# Patient Record
Sex: FEMALE | URBAN - METROPOLITAN AREA
[De-identification: names, ages, dates, MRNs, and addresses within clinical notes are randomized per-mention and may not be internally consistent; named-entity substitution may affect disease eponyms.]

---

## 2018-08-28 ENCOUNTER — APPOINTMENT (RX ONLY)
Dept: URBAN - METROPOLITAN AREA CLINIC 129 | Facility: CLINIC | Age: 38
Setting detail: DERMATOLOGY
End: 2018-08-28

## 2018-08-28 DIAGNOSIS — Z41.9 ENCOUNTER FOR PROCEDURE FOR PURPOSES OTHER THAN REMEDYING HEALTH STATE, UNSPECIFIED: ICD-10-CM

## 2018-08-28 PROCEDURE — ? RESTYLANE LYFT INJECTION

## 2018-08-28 ASSESSMENT — LOCATION DETAILED DESCRIPTION DERM
LOCATION DETAILED: RIGHT CENTRAL MALAR CHEEK
LOCATION DETAILED: LEFT CENTRAL MALAR CHEEK
LOCATION DETAILED: RIGHT INFERIOR CENTRAL MALAR CHEEK
LOCATION DETAILED: LEFT SUPERIOR CENTRAL MALAR CHEEK
LOCATION DETAILED: RIGHT LATERAL MALAR CHEEK
LOCATION DETAILED: RIGHT INFERIOR MEDIAL MALAR CHEEK
LOCATION DETAILED: LEFT INFERIOR CENTRAL MALAR CHEEK
LOCATION DETAILED: LEFT INFERIOR MEDIAL MALAR CHEEK

## 2018-08-28 ASSESSMENT — LOCATION SIMPLE DESCRIPTION DERM
LOCATION SIMPLE: RIGHT CHEEK
LOCATION SIMPLE: LEFT CHEEK

## 2018-08-28 ASSESSMENT — LOCATION ZONE DERM: LOCATION ZONE: FACE

## 2018-08-28 NOTE — PROCEDURE: RESTYLANE LYFT INJECTION
Lateral Face Filler Volume In Cc: 0
Use Map Statement For Sites (Optional): No
Filler: Desmond Zimmerman
Consent: Written consent obtained. Risks include but not limited to bruising, beading, irregular texture, ulceration, infection, allergic reaction, scar formation, incomplete augmentation, temporary nature, procedural pain.
Detail Level: Zone
Procedural Text: The filler was administered to the treatment areas noted above.
Lot #: [id number]
Cheeks Filler Volume In Cc: 0.7
Nasolabial Folds Filler Volume In Cc: 0.3
Map Statement: See Attach Map for Complete Details
Post-Care Instructions: Patient instructed to apply ice to reduce swelling.
Topical Anesthesia?: 23% lidocaine, 7% tetracaine
Price (Use Numbers Only, No Special Characters Or $): 550.0
Expiration Date (Month Year): 02/28/2021

## 2018-08-28 NOTE — HPI: OTHER
Condition:: cosmetic fillers
Please Describe Your Condition:: pt is here for cosmetic filler to treat the lines around her mouth

## 2018-08-30 ENCOUNTER — APPOINTMENT (RX ONLY)
Dept: URBAN - METROPOLITAN AREA CLINIC 129 | Facility: CLINIC | Age: 38
Setting detail: DERMATOLOGY
End: 2018-08-30

## 2018-08-30 DIAGNOSIS — L90.8 OTHER ATROPHIC DISORDERS OF SKIN: ICD-10-CM

## 2018-08-30 DIAGNOSIS — Z41.9 ENCOUNTER FOR PROCEDURE FOR PURPOSES OTHER THAN REMEDYING HEALTH STATE, UNSPECIFIED: ICD-10-CM

## 2018-08-30 PROCEDURE — 99024 POSTOP FOLLOW-UP VISIT: CPT

## 2018-08-30 PROCEDURE — ? COSMETIC FOLLOW-UP

## 2018-08-30 NOTE — PROCEDURE: COSMETIC FOLLOW-UP
Comments (Free Text): Normal finding, pt reassured.
Patient Satisfaction: Pleased
Side Effects Or Complications: Swelling
Global Improvement: None
Detail Level: Zone

## 2022-09-12 ENCOUNTER — APPOINTMENT (RX ONLY)
Dept: URBAN - METROPOLITAN AREA CLINIC 129 | Facility: CLINIC | Age: 42
Setting detail: DERMATOLOGY
End: 2022-09-12

## 2022-09-12 DIAGNOSIS — L98.8 OTHER SPECIFIED DISORDERS OF THE SKIN AND SUBCUTANEOUS TISSUE: ICD-10-CM

## 2022-09-12 PROCEDURE — ? DYSPORT

## 2022-09-12 NOTE — PROCEDURE: DYSPORT
Show Additional Area 6: Yes
[facility] Units: 0
Show Right And Left Pupillary Line Units: No
Forehead Units: 20
Detail Level: Detailed
Glabellar Complex Units: 50
Show Inventory Tab: Show
Consent: Written consent obtained. Risks include but not limited to lid/brow ptosis, bruising, swelling, diplopia, temporary effect, incomplete chemical denervation.
Post-Care Instructions: Patient instructed to not lie down for 4 hours and limit physical activity for 24 hours.
Dilution (U/0.1 Cc): 10

## 2023-01-10 ENCOUNTER — APPOINTMENT (RX ONLY)
Dept: URBAN - METROPOLITAN AREA CLINIC 129 | Facility: CLINIC | Age: 43
Setting detail: DERMATOLOGY
End: 2023-01-10

## 2023-01-10 DIAGNOSIS — L98.8 OTHER SPECIFIED DISORDERS OF THE SKIN AND SUBCUTANEOUS TISSUE: ICD-10-CM

## 2023-01-10 PROCEDURE — ? INVENTORY

## 2023-01-10 PROCEDURE — ? DYSPORT

## 2023-10-23 ENCOUNTER — APPOINTMENT (RX ONLY)
Dept: URBAN - METROPOLITAN AREA CLINIC 129 | Facility: CLINIC | Age: 43
Setting detail: DERMATOLOGY
End: 2023-10-23

## 2023-10-23 DIAGNOSIS — L98.8 OTHER SPECIFIED DISORDERS OF THE SKIN AND SUBCUTANEOUS TISSUE: ICD-10-CM

## 2023-10-23 PROCEDURE — ? DYSPORT

## 2023-10-23 ASSESSMENT — LOCATION SIMPLE DESCRIPTION DERM
LOCATION SIMPLE: RIGHT CHEEK
LOCATION SIMPLE: RIGHT FOREHEAD
LOCATION SIMPLE: LEFT FOREHEAD
LOCATION SIMPLE: GLABELLA
LOCATION SIMPLE: LEFT CHEEK

## 2023-10-23 ASSESSMENT — LOCATION DETAILED DESCRIPTION DERM
LOCATION DETAILED: GLABELLA
LOCATION DETAILED: LEFT SUPERIOR LATERAL MALAR CHEEK
LOCATION DETAILED: LEFT FOREHEAD
LOCATION DETAILED: RIGHT FOREHEAD
LOCATION DETAILED: RIGHT SUPERIOR LATERAL MALAR CHEEK

## 2023-10-23 ASSESSMENT — LOCATION ZONE DERM: LOCATION ZONE: FACE

## 2023-10-23 NOTE — PROCEDURE: DYSPORT
[facility] Units: 0
Show Glabellar Units: Yes
Forehead Units: 20
Show Ucl Units: No
Detail Level: Detailed
Show Inventory Tab: Show
Additional Comments: [id number]
Consent: Written consent obtained. Risks include but not limited to lid/brow ptosis, bruising, swelling, diplopia, temporary effect, incomplete chemical denervation.
Post-Care Instructions: Patient instructed to not lie down for 4 hours and limit physical activity for 24 hours.
Dilution (U/0.1 Cc): 10
Glabellar Complex Units: 50
Additional Comments: [id number]

## 2024-05-01 ENCOUNTER — APPOINTMENT (RX ONLY)
Dept: URBAN - METROPOLITAN AREA CLINIC 129 | Facility: CLINIC | Age: 44
Setting detail: DERMATOLOGY
End: 2024-05-01

## 2024-05-01 DIAGNOSIS — L98.8 OTHER SPECIFIED DISORDERS OF THE SKIN AND SUBCUTANEOUS TISSUE: ICD-10-CM

## 2024-05-01 PROCEDURE — ? ADDITIONAL NOTES

## 2024-05-01 PROCEDURE — ? DYSPORT

## 2024-05-01 NOTE — PROCEDURE: ADDITIONAL NOTES
Detail Level: Simple
Render Risk Assessment In Note?: no
Additional Notes: 0414
Additional Notes: [id number]

## 2024-05-01 NOTE — PROCEDURE: DYSPORT
Show Right And Left Brow Units: No
Show Depressor Anguli Units: Yes
Additional Area 5 Units: 0
Glabellar Complex Units: 30
Consent: Written consent obtained. Risks include but not limited to lid/brow ptosis, bruising, swelling, diplopia, temporary effect, incomplete chemical denervation.
Post-Care Instructions: Patient instructed to not lie down for 4 hours and limit physical activity for 24 hours.
Forehead Units: 20
Dilution (U/0.1 Cc): 10
Detail Level: Detailed
Show Inventory Tab: Show

## 2025-02-28 ENCOUNTER — APPOINTMENT (OUTPATIENT)
Dept: URBAN - METROPOLITAN AREA CLINIC 129 | Facility: CLINIC | Age: 45
Setting detail: DERMATOLOGY
End: 2025-02-28

## 2025-02-28 DIAGNOSIS — L98.8 OTHER SPECIFIED DISORDERS OF THE SKIN AND SUBCUTANEOUS TISSUE: ICD-10-CM

## 2025-02-28 PROCEDURE — ? DYSPORT

## 2025-02-28 PROCEDURE — ? COUNSELING

## 2025-02-28 ASSESSMENT — LOCATION SIMPLE DESCRIPTION DERM
LOCATION SIMPLE: LEFT TEMPLE
LOCATION SIMPLE: FRONTAL SCALP
LOCATION SIMPLE: INFERIOR FOREHEAD

## 2025-02-28 ASSESSMENT — LOCATION DETAILED DESCRIPTION DERM
LOCATION DETAILED: LEFT INFERIOR TEMPLE
LOCATION DETAILED: INFERIOR MID FOREHEAD
LOCATION DETAILED: MEDIAL FRONTAL SCALP

## 2025-02-28 ASSESSMENT — LOCATION ZONE DERM
LOCATION ZONE: FACE
LOCATION ZONE: SCALP

## 2025-02-28 NOTE — PROCEDURE: DYSPORT
Left Pupillary Line Units: 0
Show Inventory Tab: Show
Show Orbicularis Oculi Units: Yes
Show Ucl Units: No
Consent: Written consent obtained. Risks include but not limited to lid/brow ptosis, bruising, swelling, diplopia, temporary effect, incomplete chemical denervation.
Periorbital Skin Units: 20
Post-Care Instructions: Patient instructed to not lie down for 4 hours and limit physical activity for 24 hours.
Dilution (U/0.1 Cc): 10
Detail Level: Detailed
Glabellar Complex Units: 50